# Patient Record
(demographics unavailable — no encounter records)

---

## 2018-08-07 NOTE — MRI
MRI PELVIS WITH AND WITHOUT IV CONTRAST:

(PROSTATE)

 

Date:  08/07/18 

 

HISTORY:  

Elevated PSA. PSA measurements were 6.21 on 07/13/18 and 6.1 on 12/04/14 (normal 0-4). No biopsies pe
rformed. 

 

COMPARISON:  

None. 

 

TECHNIQUE:  

Multiplanar, multisequence MRI of pelvis was performed with and without IV contrast using the prostat
e protocol. An independent 3D workstation was used for review. 

 

FINDINGS:

The prostate gland is enlarged measuring 5.0 x 4.5 x 5.0 cm and a volume of 58.5 mL. 

 

No focal abnormal area of restricted diffusion is seen in the peripheral zone. No lentiform area of a
bnormally decreased T2 signal is noted in the transition zone. No focal arterial enhancing mass is id
entified. The prostatic capsule is intact. The seminal vesicles appear normal. No lymphadenopathy see
n. The pelvic side wall is normal. No bone marrow abnormalities are seen. 

 

There is sigmoid diverticulosis. No free fluid is seen in the pelvis. 

 

Heterogeneity in the prostate gland is consistent with BPH. 

 

IMPRESSION: 

PI-RADS 2:  Low (clinically significant prostate cancer is unlikely to be present). 

 

 

This study was interpreted in consultation with Dr. Gary Baugh, who concurs. 

 

 

 

 

POS: [facility]